# Patient Record
Sex: MALE | Race: WHITE | Employment: OTHER | URBAN - METROPOLITAN AREA
[De-identification: names, ages, dates, MRNs, and addresses within clinical notes are randomized per-mention and may not be internally consistent; named-entity substitution may affect disease eponyms.]

---

## 2024-10-31 ENCOUNTER — APPOINTMENT (OUTPATIENT)
Dept: GENERAL RADIOLOGY | Facility: HOSPITAL | Age: 64
End: 2024-10-31
Payer: COMMERCIAL

## 2024-10-31 ENCOUNTER — HOSPITAL ENCOUNTER (EMERGENCY)
Facility: HOSPITAL | Age: 64
Discharge: HOME | End: 2024-10-31
Attending: EMERGENCY MEDICINE
Payer: COMMERCIAL

## 2024-10-31 ENCOUNTER — APPOINTMENT (OUTPATIENT)
Dept: GENERAL RADIOLOGY | Facility: HOSPITAL | Age: 64
End: 2024-10-31
Attending: EMERGENCY MEDICINE
Payer: COMMERCIAL

## 2024-10-31 VITALS
HEIGHT: 72 IN | BODY MASS INDEX: 24.11 KG/M2 | HEART RATE: 71 BPM | DIASTOLIC BLOOD PRESSURE: 82 MMHG | SYSTOLIC BLOOD PRESSURE: 147 MMHG | OXYGEN SATURATION: 98 % | RESPIRATION RATE: 18 BRPM | WEIGHT: 178 LBS | TEMPERATURE: 98 F

## 2024-10-31 DIAGNOSIS — S52.501A CLOSED FRACTURE OF DISTAL END OF RIGHT RADIUS, UNSPECIFIED FRACTURE MORPHOLOGY, INITIAL ENCOUNTER: Primary | ICD-10-CM

## 2024-10-31 DIAGNOSIS — S52.92XA LEFT FOREARM FRACTURE, CLOSED, INITIAL ENCOUNTER: ICD-10-CM

## 2024-10-31 PROCEDURE — 25605 CLTX DST RDL FX/EPHYS SEP W/: CPT

## 2024-10-31 PROCEDURE — 73110 X-RAY EXAM OF WRIST: CPT | Performed by: EMERGENCY MEDICINE

## 2024-10-31 PROCEDURE — 73090 X-RAY EXAM OF FOREARM: CPT | Performed by: EMERGENCY MEDICINE

## 2024-10-31 PROCEDURE — 99284 EMERGENCY DEPT VISIT MOD MDM: CPT

## 2024-10-31 PROCEDURE — 99285 EMERGENCY DEPT VISIT HI MDM: CPT

## 2024-10-31 RX ORDER — HYDROCODONE BITARTRATE AND ACETAMINOPHEN 5; 325 MG/1; MG/1
1-2 TABLET ORAL EVERY 6 HOURS PRN
Qty: 7 TABLET | Refills: 0 | Status: SHIPPED | OUTPATIENT
Start: 2024-10-31 | End: 2024-11-05

## 2024-10-31 RX ORDER — HYDROCODONE BITARTRATE AND ACETAMINOPHEN 5; 325 MG/1; MG/1
1 TABLET ORAL ONCE
Status: COMPLETED | OUTPATIENT
Start: 2024-10-31 | End: 2024-10-31

## 2024-10-31 RX ORDER — LIDOCAINE HYDROCHLORIDE 10 MG/ML
20 INJECTION, SOLUTION EPIDURAL; INFILTRATION; INTRACAUDAL; PERINEURAL ONCE
Status: COMPLETED | OUTPATIENT
Start: 2024-10-31 | End: 2024-10-31

## 2024-10-31 RX ORDER — HYDROCODONE BITARTRATE AND ACETAMINOPHEN 5; 325 MG/1; MG/1
1-2 TABLET ORAL EVERY 6 HOURS PRN
Qty: 7 TABLET | Refills: 0 | Status: SHIPPED | OUTPATIENT
Start: 2024-10-31 | End: 2024-10-31 | Stop reason: ALTCHOICE

## 2024-10-31 NOTE — ED INITIAL ASSESSMENT (HPI)
Mechanical fall while exercising today at 1115 this morning. +pain, swelling and deformity to left wrist. Skin p/w/d distal to injury. 2+ radial pulse present. Hit head. No LOC. No neck pain.

## 2024-10-31 NOTE — ED PROVIDER NOTES
[facility]  Emergency Department Attending Note     Chief Complaint:   Chief Complaint   Patient presents with    Arm or Hand Injury     HISTORY OF PRESENT ILLNESS:   Sriram Love is a 64 year old male who presents to the ED with complaint of left arm pain after mechanical injury earlier today.  Fell on the left arm while doing a workout.  Denies head trauma or LOC.  Denies any nausea or vomiting.  No chest pain or dyspnea.  No syncope.  No paralysis or paresthesia.  Able to move the digits.  Deformity to the wrist.     MEDICAL & SOCIAL HISTORY:   History reviewed. No pertinent past medical history. History reviewed. No pertinent surgical history.   Social History     Socioeconomic History    Marital status: Single   Tobacco Use    Smoking status: Never    Smokeless tobacco: Never   Vaping Use    Vaping status: Never Used   Substance and Sexual Activity    Alcohol use: Never    Drug use: Never    Allergies[1]   Current Outpatient Medications   Medication Sig Dispense Refill    HYDROcodone-acetaminophen 5-325 MG Oral Tab Take 1-2 tablets by mouth every 6 (six) hours as needed for Pain. 7 tablet 0          REVIEW OF SYSTEMS   A 10 point review of systems was completed and is negative except as listed in history of present illness      PHYSICAL EXAM   Vitals: /77   Pulse 69   Temp 98.2 °F (36.8 °C) (Temporal)   Resp 18   Ht 182.9 cm (6')   Wt 80.7 kg   SpO2 98%   BMI 24.14 kg/m²   /77   Pulse 69   Temp 98.2 °F (36.8 °C) (Temporal)   Resp 18   Ht 182.9 cm (6')   Wt 80.7 kg   SpO2 98%   BMI 24.14 kg/m²     General: A&Ox3, NAD  Constitutional: Well developed, well nourished, nontoxic  Head: atraumatic, normocephalic   Eyes: conjuctiva clear, no icterus  Ears: normal external appearance, no drainage  Nose:  Atraumatic, no swelling, no drainage, nares patent  Throat:  Moist pink mucous membranes, airway is patent  Neck:  Soft supple, no masses, no tracheal deviation, no stridor  Chest:  No  bruising or abrasions, no tenderness, no deformity  Cardiac:  Regular rate and rhythm  Lung:  No distress, no retractions  Abdomen:  Soft, nontender, nondistended, normal BS  Back: No stepoff/deformity  Extremities: Obvious deformity to the left wrist with a distal radius deformity, tender to palpation with some hematoma to the area, no open fracture no laceration, full range of motion to the digits normal first and second and first and fifth digit opposition and normal sensation intact to all digits in all distribution normal distal capillary refill, able to supinate and pronate at the elbow without difficulty  Neuro: No facial droop, no slurred speech, moving all extremities freely, SILT to the bilateral upper and lower extremities  Psych: A&Ox3, normal affect, cooperative, calm  Skin: No rash, no petechiae/purpura, warm, dry      RESULTS  LABS: No results found for this or any previous visit.      IMAGING: XR FOREARM (2 VIEWS), LEFT (CPT=73090)    Result Date: 10/31/2024  CONCLUSION:  1. Reduction of previously seen comminuted fracture of the distal radius which now has more anatomic alignment with minimal dorsal angulation of the distal radial fracture fragment.  Fracture extends into the radiocarpal joint.  No dislocation.    Dictated by (CST): Milton Palomo MD on 10/31/2024 at 3:48 PM     Finalized by (CST): Milton Palomo MD on 10/31/2024 at 3:50 PM          XR WRIST COMPLETE (MIN 3 VIEWS), LEFT (CPT=73110)    Result Date: 10/31/2024  CONCLUSION:  1. Acute comminuted intra-articular fracture of the distal radius with dorsal displacement, impaction, and angulation. 2. Moderate 1st CMC joint osteoarthritis.    Dictated by (CST): Brayden Ratliff MD on 10/31/2024 at 1:21 PM     Finalized by (CST): Brayden Ratliff MD on 10/31/2024 at 1:22 PM           I personally reviewed the radiology study and my finding were comminuted intra-articular fracture of the distal radius with some dorsal displacement      Procedures:    Procedures       ED COURSE          Re-Evaluation: Improved      Disposition & Plan:   Clinical Impression/Final Diagnosis:   1. Closed fracture of distal end of left radius, unspecified fracture morphology, initial encounter    2. Left forearm fracture, closed, initial encounter        Medical Decision Making: Fracture/Dislocation reduction:   Informed consent was obtained.  The standard timeout procedure was completed. The distal radius was reduced in the usual fashion without complications after hematoma block performed by myself with 4 ml 1% lidocaine instilled at the hematoma.  Post reduction the patient's neurovascular exam is normal.  Post reduction x-ray demonstrates reduction of the joint to the anatomic position.   The procedure was performed by myself.    A left short arm splint was applied to the fiorearm.  After application of the splint I returned and re-examined the patient.  The splint was adequately immobilizing the joint and distal to the splint the patient's circulation and sensation was intact.        Medical Decision Making  Amount and/or Complexity of Data Reviewed  External Data Reviewed: notes.  Radiology: ordered and independent interpretation performed. Decision-making details documented in ED Course.    Risk  OTC drugs.  Prescription drug management.  Decision regarding hospitalization.        Disposition: Discharge  There are no disposition comments on file for this visit.     This note was generated in part using voice recognition dictation technology. The report was reviewed by this physician but still may have unintentional errors due to inherent limitations of voice recognition technology. All times are estimates.         [1] No Known Allergies

## 2024-11-11 ENCOUNTER — TELEPHONE (OUTPATIENT)
Dept: FAMILY MEDICINE CLINIC | Facility: CLINIC | Age: 64
End: 2024-11-11

## 2024-11-11 DIAGNOSIS — R73.01 IMPAIRED FASTING GLUCOSE: Primary | ICD-10-CM

## 2024-11-11 DIAGNOSIS — R94.31 ABNORMAL EKG: ICD-10-CM

## 2024-11-11 NOTE — TELEPHONE ENCOUNTER
Left radial fracture repair on 11/14/24 with Dr. Valdez @ [facility]     H&P- Completed 11/12/2024 with Dr. Bhargav Yanes   Labs- FBS (107), BUN/Crea (21.4), Lymphocytes (0.91), A1C (5.7), all other labs WNL  EKG- NSR w/incomplete RBBB, L anterior fascicular block, septal infarct age undetermined

## 2024-11-12 ENCOUNTER — OFFICE VISIT (OUTPATIENT)
Dept: FAMILY MEDICINE CLINIC | Facility: CLINIC | Age: 64
End: 2024-11-12
Payer: COMMERCIAL

## 2024-11-12 ENCOUNTER — LAB ENCOUNTER (OUTPATIENT)
Dept: LAB | Facility: HOSPITAL | Age: 64
End: 2024-11-12
Attending: FAMILY MEDICINE
Payer: COMMERCIAL

## 2024-11-12 VITALS
SYSTOLIC BLOOD PRESSURE: 124 MMHG | HEART RATE: 70 BPM | WEIGHT: 178 LBS | BODY MASS INDEX: 24.11 KG/M2 | TEMPERATURE: 97 F | HEIGHT: 72 IN | OXYGEN SATURATION: 99 % | RESPIRATION RATE: 16 BRPM | DIASTOLIC BLOOD PRESSURE: 64 MMHG

## 2024-11-12 DIAGNOSIS — R73.01 IMPAIRED FASTING GLUCOSE: ICD-10-CM

## 2024-11-12 DIAGNOSIS — Z01.818 PREOP EXAMINATION: ICD-10-CM

## 2024-11-12 DIAGNOSIS — R35.0 BENIGN PROSTATIC HYPERPLASIA WITH URINARY FREQUENCY: ICD-10-CM

## 2024-11-12 DIAGNOSIS — N40.1 BENIGN PROSTATIC HYPERPLASIA WITH URINARY FREQUENCY: ICD-10-CM

## 2024-11-12 DIAGNOSIS — Z01.812 PRE-OPERATIVE LABORATORY EXAMINATION: ICD-10-CM

## 2024-11-12 DIAGNOSIS — S52.352K: Primary | ICD-10-CM

## 2024-11-12 LAB
ALBUMIN SERPL-MCNC: 4.8 G/DL (ref 3.2–4.8)
ALBUMIN/GLOB SERPL: 1.7 {RATIO} (ref 1–2)
ALP LIVER SERPL-CCNC: 82 U/L
ALT SERPL-CCNC: 22 U/L
ANION GAP SERPL CALC-SCNC: 3 MMOL/L (ref 0–18)
AST SERPL-CCNC: 18 U/L (ref ?–34)
ATRIAL RATE: 62 BPM
BASOPHILS # BLD AUTO: 0.03 X10(3) UL (ref 0–0.2)
BASOPHILS NFR BLD AUTO: 0.5 %
BILIRUB SERPL-MCNC: 1.1 MG/DL (ref 0.2–1.1)
BUN BLD-MCNC: 22 MG/DL (ref 9–23)
BUN/CREAT SERPL: 21.4 (ref 10–20)
CALCIUM BLD-MCNC: 10 MG/DL (ref 8.7–10.4)
CHLORIDE SERPL-SCNC: 103 MMOL/L (ref 98–112)
CO2 SERPL-SCNC: 32 MMOL/L (ref 21–32)
CREAT BLD-MCNC: 1.03 MG/DL
DEPRECATED RDW RBC AUTO: 44.9 FL (ref 35.1–46.3)
EGFRCR SERPLBLD CKD-EPI 2021: 82 ML/MIN/1.73M2 (ref 60–?)
EOSINOPHIL # BLD AUTO: 0.04 X10(3) UL (ref 0–0.7)
EOSINOPHIL NFR BLD AUTO: 0.7 %
ERYTHROCYTE [DISTWIDTH] IN BLOOD BY AUTOMATED COUNT: 13.5 % (ref 11–15)
EST. AVERAGE GLUCOSE BLD GHB EST-MCNC: 117 MG/DL (ref 68–126)
FASTING STATUS PATIENT QL REPORTED: YES
GLOBULIN PLAS-MCNC: 2.9 G/DL (ref 2–3.5)
GLUCOSE BLD-MCNC: 107 MG/DL (ref 70–99)
HBA1C MFR BLD: 5.7 % (ref ?–5.7)
HCT VFR BLD AUTO: 45.4 %
HGB BLD-MCNC: 15.3 G/DL
IMM GRANULOCYTES # BLD AUTO: 0.07 X10(3) UL (ref 0–1)
IMM GRANULOCYTES NFR BLD: 1.3 %
LYMPHOCYTES # BLD AUTO: 0.91 X10(3) UL (ref 1–4)
LYMPHOCYTES NFR BLD AUTO: 16.4 %
MCH RBC QN AUTO: 30.5 PG (ref 26–34)
MCHC RBC AUTO-ENTMCNC: 33.7 G/DL (ref 31–37)
MCV RBC AUTO: 90.6 FL
MONOCYTES # BLD AUTO: 0.51 X10(3) UL (ref 0.1–1)
MONOCYTES NFR BLD AUTO: 9.2 %
NEUTROPHILS # BLD AUTO: 3.99 X10 (3) UL (ref 1.5–7.7)
NEUTROPHILS # BLD AUTO: 3.99 X10(3) UL (ref 1.5–7.7)
NEUTROPHILS NFR BLD AUTO: 71.9 %
OSMOLALITY SERPL CALC.SUM OF ELEC: 290 MOSM/KG (ref 275–295)
P AXIS: 71 DEGREES
P-R INTERVAL: 152 MS
PLATELET # BLD AUTO: 292 10(3)UL (ref 150–450)
POTASSIUM SERPL-SCNC: 4.3 MMOL/L (ref 3.5–5.1)
PROT SERPL-MCNC: 7.7 G/DL (ref 5.7–8.2)
Q-T INTERVAL: 408 MS
QRS DURATION: 112 MS
QTC CALCULATION (BEZET): 414 MS
R AXIS: -49 DEGREES
RBC # BLD AUTO: 5.01 X10(6)UL
SODIUM SERPL-SCNC: 138 MMOL/L (ref 136–145)
T AXIS: 41 DEGREES
VENTRICULAR RATE: 62 BPM
WBC # BLD AUTO: 5.6 X10(3) UL (ref 4–11)

## 2024-11-12 PROCEDURE — 99204 OFFICE O/P NEW MOD 45 MIN: CPT | Performed by: FAMILY MEDICINE

## 2024-11-12 PROCEDURE — 85025 COMPLETE CBC W/AUTO DIFF WBC: CPT | Performed by: FAMILY MEDICINE

## 2024-11-12 PROCEDURE — 83036 HEMOGLOBIN GLYCOSYLATED A1C: CPT | Performed by: FAMILY MEDICINE

## 2024-11-12 PROCEDURE — 80053 COMPREHEN METABOLIC PANEL: CPT | Performed by: FAMILY MEDICINE

## 2024-11-12 RX ORDER — UBIDECARENONE 50 MG
1 CAPSULE ORAL DAILY
COMMUNITY

## 2024-11-12 RX ORDER — MULTIVIT WITH MINERALS/LUTEIN
1000 TABLET ORAL DAILY
COMMUNITY

## 2024-11-12 RX ORDER — MULTIVIT-MIN/IRON FUM/FOLIC AC 7.5 MG-4
2 TABLET ORAL DAILY
COMMUNITY

## 2024-11-12 NOTE — TELEPHONE ENCOUNTER
Echo scheduled for tomorrow 11/13 at 1pm @ [facility].    Registration at [facility] left a message with Echo Dept to see if we can get patient in at [place] tomorrow due to difficulty driving with one arm.     Spoke to [facility] and changed location of Echo to [facility]. Call reference # [id number]    Fax sent to [facility] @ [phone] with info on why patient needs Echo done prior to surgery along with abnormal EKG attached.

## 2024-11-12 NOTE — H&P
[facility] PRE-OP CLINIC [place]    PRE-OP NOTE    HPI:   I have been consulted by Dr. Valdez to see Sriram Love 64 year old male for a preoperative evaluation and medical clearance. He has a  history of a fall injury and sustained left radial fracture . Patient is to have a left radial fracture a ORIF/Repair  by Dr. Valdez  on 11/14/2024.     Pt suffers significant pain and loss of function.     He has no cardiopulmonary symptoms.      He has no history of HERMINIA or DVT. Denies tobacco use.       Family History   Problem Relation Age of Onset    Cancer Father         prostate    Other (Other) Mother         DVT    Cancer Mother         skin      Current Outpatient Medications   Medication Sig Dispense Refill    Iodine, Kelp, (KELP OR) Take by mouth. 80mg calcium, 225mcg iodine      Cholecalciferol (VITAMIN D3 OR) Take 50 mcg by mouth daily.      Multiple Vitamins-Minerals (MULTI-VITAMIN/MINERALS) Oral Tab Take 2 tablets by mouth daily. [employer] Bal[employer]zar Men      Coenzyme Q10 (COQ10) 50 MG Oral Cap Take 1 capsule by mouth daily.      Glucosamine-Chondroitin-MSM (TRIPLE FLEX OR) Take 1 tablet by mouth daily. [employer] Tri-Flex- Glucosamine/Chondroitin/Turmeric      Ascorbic Acid (VITAMIN C) 1000 MG Oral Tab Take 1 tablet (1,000 mg total) by mouth daily.      Calcium Carb-Cholecalciferol (CALCIUM + D3 OR) Take 600 mg by mouth daily.       Past Medical History:    Visual impairment    reading glasses only     History reviewed. No pertinent surgical history.  Social History     Socioeconomic History    Marital status: Single     Spouse name: Not on file    Number of children: Not on file    Years of education: Not on file    Highest education level: Not on file   Occupational History    Not on file   Tobacco Use    Smoking status: Never     Passive exposure: Never    Smokeless tobacco: Never   Vaping Use    Vaping status: Never Used   Substance and Sexual Activity    Alcohol use: Never    Drug use: Never    Sexual activity: Not on file    Other Topics Concern    Not on file   Social History Narrative    Not on file     Social Drivers of Health     Financial Resource Strain: Not on file   Food Insecurity: Not on file   Transportation Needs: Not on file   Physical Activity: Not on file   Stress: Not on file   Social Connections: Not on file   Housing Stability: Not on file       REVIEW OF SYSTEMS:   CONSTITUTIONAL:  Denies unusual weight gain/loss, fever, chills  EENT:  Eyes:  Denies eye pain, visual loss, blurred vision, double vision. Ears, Nose, Throat:  Denies congestion, runny nose or sore throat.  INTEGUMENTARY:  Denies rashes, itching, skin lesion,   CARDIOVASCULAR:  Denies DVT. Denies chest pain, palpitations, edema, dyspnea  RESPIRATORY: Denies  HERMINIA ,Denies shortness of breath, wheezing, cough  GASTROINTESTINAL:  Denies abdominal pain, nausea, vomiting, constipation, diarrhea, or blood in stool.  MUSCULOSKELETAL: severe pain to his left wrist as noted above  NEUROLOGICAL:  Denies headache, seizures, dizziness, syncope, paralysis, ataxia,  HEMATOLOGIC:  Denies anemia, bleeding or bruising.  LYMPHATICS:  Denies enlarged nodes   PSYCHIATRIC:  Denies depression or anxiety.  ENDOCRINOLOGIC: DM 2 NO,   ALLERGIES:  Denies allergic response, history of asthma, hives,     EXAM:   /64 (BP Location: Right arm)   Pulse 70   Temp 97.2 °F (36.2 °C) (Temporal)   Resp 16   Ht 6' (1.829 m)   Wt 178 lb (80.7 kg)   SpO2 99%   BMI 24.14 kg/m²  Estimated body mass index is 24.14 kg/m² as calculated from the following:    Height as of this encounter: 6' (1.829 m).    Weight as of this encounter: 178 lb (80.7 kg).   Vital signs reviewed.Appears stated age, well groomed.  Physical Exam:  GEN:  Patient is alert, awake and oriented, well developed, well nourished, no apparent distress.  HEENT:  Head:  Normocephalic, atraumatic  Nose: patent, no nasal discharge  NECK: Supple, no CLAD, no carotid bruit, no thyromegaly.  SKIN: No rashes, no skin lesion, no  bruising, good turgor.  HEART:  Regular rate and rhythm, no murmurs, rubs or gallops.  LUNGS: Clear to auscultation bilterally, no rales/rhonchi/wheezing.  CHEST: No tenderness.  ABDOMEN:  Soft, nondistended, nontender,  no masses, no hepatosplenomegaly.  BACK: No tenderness, no spasm,   EXTREMITIES:  left forearm in posterior mold with distal C/M/S intact to his left hand    NEURO:  No focal deficit, speech fluent, normal gait, strength and tone, sensory intact      Lab Results   Component Value Date    WBC 5.6 11/12/2024    HGB 15.3 11/12/2024    HCT 45.4 11/12/2024    .0 11/12/2024       No results found.    EKG 12 Lead    Result Date: 11/12/2024  Normal sinus rhythm Incomplete right bundle branch block Left anterior fascicular block Septal infarct , age undetermined Abnormal ECG No previous ECGs found in [facility] Confirmed by Angel Escalante ([id number]) on 11/12/2024 9:57:09 AM     ASSESSMENT AND PLAN:   Sriram Love is a 64 year old male, with a hx of a fall injury and sustained left radius fracture who presents for a pre-operative physical exam. Patient is to have a left radial fracture ORIF/Repair  by Dr. Valdez  on 11/14/2024.      ICD-10-CM    1. 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 Benign prostatic hyperplasia with urinary frequency  N40.1     R35.0       3. Preop examination  Z01.818 CBC With Differential With Platelet     Comp Metabolic Panel (14)     EKG 12 Lead      4. Pre-operative laboratory examination  Z01.812 CBC With Differential With Platelet     Comp Metabolic Panel (14)     EKG 12 Lead         ECG and labs are pending review     Preoperative Risk Stratification: There are no decompensated medical conditions. ASA classification 1    Medical history:  High risk surgery (vascular, thoracic, intra-peritoneal): No  CAD: No  CHF: No  Stroke: No  DM on insulin: No  Serum Creatinine >2 mg/dl: No  Patient has an RCRI score that is very low risk and is at  low risk for major cardiac event in the perioperative period.     Patient is medically optimized and has an acceptable risk of surgery and may proceed with surgery as planned.     PLAN:    Patient to discontinue medications and supplements with anticoagulation properties as per instruction.        Postoperative Recommendations:    Anticoagulation / DVT prophylaxis: SCDs, early ambulation.   GI protection: Protonix  Incentive Spirometry   Telemetry as needed  CPAP/O2 as needed   DM: QID glucoscans and sliding scale insulin as needed   Renal protection: (hydration / NSAID and ACE/ARB avoidance)   Cognitive protection: (minimize narcotics, benzodiazepines, scopolamine)     Pain management and Physical therapy as per Orthopedic service.   Home Health as needed    Thank you for the opportunity to care for your patient and to assist in managing the postoperative course.        Bhargav Yanes DO  11/12/2024  10:20 AM

## 2024-11-13 ENCOUNTER — HOSPITAL ENCOUNTER (OUTPATIENT)
Dept: CV DIAGNOSTICS | Facility: HOSPITAL | Age: 64
Discharge: HOME | End: 2024-11-13
Attending: FAMILY MEDICINE
Payer: COMMERCIAL

## 2024-11-13 DIAGNOSIS — R94.31 ABNORMAL EKG: ICD-10-CM

## 2024-11-13 DIAGNOSIS — R73.01 IMPAIRED FASTING GLUCOSE: ICD-10-CM

## 2024-11-13 PROCEDURE — 93306 TTE W/DOPPLER COMPLETE: CPT | Performed by: FAMILY MEDICINE

## 2024-11-13 NOTE — TELEPHONE ENCOUNTER
Dr. Yanes reviewed Echo, [place] for surgery. Patient notified and results reviewed.     Dr. Valdez's office notified.     Preop packet faxed to [facility].